# Patient Record
Sex: MALE | Race: WHITE | NOT HISPANIC OR LATINO | Employment: UNEMPLOYED | URBAN - METROPOLITAN AREA
[De-identification: names, ages, dates, MRNs, and addresses within clinical notes are randomized per-mention and may not be internally consistent; named-entity substitution may affect disease eponyms.]

---

## 2018-10-03 ENCOUNTER — APPOINTMENT (OUTPATIENT)
Dept: GENERAL RADIOLOGY | Facility: HOSPITAL | Age: 53
End: 2018-10-03

## 2018-10-03 ENCOUNTER — HOSPITAL ENCOUNTER (EMERGENCY)
Facility: HOSPITAL | Age: 53
Discharge: HOME | End: 2018-10-03
Attending: EMERGENCY MEDICINE | Admitting: EMERGENCY MEDICINE

## 2018-10-03 VITALS
BODY MASS INDEX: 20.76 KG/M2 | SYSTOLIC BLOOD PRESSURE: 216 MMHG | WEIGHT: 132.28 LBS | HEART RATE: 89 BPM | TEMPERATURE: 97.6 F | RESPIRATION RATE: 20 BRPM | HEIGHT: 67 IN | DIASTOLIC BLOOD PRESSURE: 134 MMHG | OXYGEN SATURATION: 100 %

## 2018-10-03 DIAGNOSIS — I10 HYPERTENSION, UNSPECIFIED TYPE: ICD-10-CM

## 2018-10-03 DIAGNOSIS — N18.6 END STAGE RENAL DISEASE (HCC): Primary | ICD-10-CM

## 2018-10-03 DIAGNOSIS — G89.29 CHRONIC LOW BACK PAIN WITHOUT SCIATICA, UNSPECIFIED BACK PAIN LATERALITY: ICD-10-CM

## 2018-10-03 DIAGNOSIS — T14.8XXA AVULSION OF SKIN: ICD-10-CM

## 2018-10-03 DIAGNOSIS — M54.50 CHRONIC LOW BACK PAIN WITHOUT SCIATICA, UNSPECIFIED BACK PAIN LATERALITY: ICD-10-CM

## 2018-10-03 LAB
ALBUMIN SERPL-MCNC: 3.77 G/DL (ref 3.2–4.8)
ALBUMIN/GLOB SERPL: 1.2 G/DL (ref 1.5–2.5)
ALP SERPL-CCNC: 160 U/L (ref 25–100)
ALT SERPL W P-5'-P-CCNC: 7 U/L (ref 7–40)
ANION GAP SERPL CALCULATED.3IONS-SCNC: 14 MMOL/L (ref 3–11)
AST SERPL-CCNC: 20 U/L (ref 0–33)
BASOPHILS # BLD AUTO: 0.03 10*3/MM3 (ref 0–0.2)
BASOPHILS NFR BLD AUTO: 0.4 % (ref 0–1)
BILIRUB SERPL-MCNC: 0.3 MG/DL (ref 0.3–1.2)
BUN BLD-MCNC: 31 MG/DL (ref 9–23)
BUN/CREAT SERPL: 6.8 (ref 7–25)
CALCIUM SPEC-SCNC: 10.3 MG/DL (ref 8.7–10.4)
CHLORIDE SERPL-SCNC: 90 MMOL/L (ref 99–109)
CO2 SERPL-SCNC: 30 MMOL/L (ref 20–31)
CREAT BLD-MCNC: 4.57 MG/DL (ref 0.6–1.3)
DEPRECATED RDW RBC AUTO: 51.2 FL (ref 37–54)
EOSINOPHIL # BLD AUTO: 0.07 10*3/MM3 (ref 0–0.3)
EOSINOPHIL NFR BLD AUTO: 1 % (ref 0–3)
ERYTHROCYTE [DISTWIDTH] IN BLOOD BY AUTOMATED COUNT: 16.9 % (ref 11.3–14.5)
GFR SERPL CREATININE-BSD FRML MDRD: 14 ML/MIN/1.73
GLOBULIN UR ELPH-MCNC: 3.1 GM/DL
GLUCOSE BLD-MCNC: 68 MG/DL (ref 70–100)
HCT VFR BLD AUTO: 30.1 % (ref 38.9–50.9)
HGB BLD-MCNC: 9.6 G/DL (ref 13.1–17.5)
IMM GRANULOCYTES # BLD: 0.02 10*3/MM3 (ref 0–0.03)
IMM GRANULOCYTES NFR BLD: 0.3 % (ref 0–0.6)
LYMPHOCYTES # BLD AUTO: 0.91 10*3/MM3 (ref 0.6–4.8)
LYMPHOCYTES NFR BLD AUTO: 13.5 % (ref 24–44)
MCH RBC QN AUTO: 26.5 PG (ref 27–31)
MCHC RBC AUTO-ENTMCNC: 31.9 G/DL (ref 32–36)
MCV RBC AUTO: 83.1 FL (ref 80–99)
MONOCYTES # BLD AUTO: 0.72 10*3/MM3 (ref 0–1)
MONOCYTES NFR BLD AUTO: 10.7 % (ref 0–12)
NEUTROPHILS # BLD AUTO: 5.03 10*3/MM3 (ref 1.5–8.3)
NEUTROPHILS NFR BLD AUTO: 74.4 % (ref 41–71)
PLATELET # BLD AUTO: 274 10*3/MM3 (ref 150–450)
PMV BLD AUTO: 10.2 FL (ref 6–12)
POTASSIUM BLD-SCNC: 4.6 MMOL/L (ref 3.5–5.5)
PROT SERPL-MCNC: 6.9 G/DL (ref 5.7–8.2)
RBC # BLD AUTO: 3.62 10*6/MM3 (ref 4.2–5.76)
SODIUM BLD-SCNC: 134 MMOL/L (ref 132–146)
WBC NRBC COR # BLD: 6.76 10*3/MM3 (ref 3.5–10.8)

## 2018-10-03 PROCEDURE — 85025 COMPLETE CBC W/AUTO DIFF WBC: CPT | Performed by: PHYSICIAN ASSISTANT

## 2018-10-03 PROCEDURE — 71046 X-RAY EXAM CHEST 2 VIEWS: CPT

## 2018-10-03 PROCEDURE — 99284 EMERGENCY DEPT VISIT MOD MDM: CPT

## 2018-10-03 PROCEDURE — 80053 COMPREHEN METABOLIC PANEL: CPT | Performed by: PHYSICIAN ASSISTANT

## 2018-10-03 RX ORDER — CLONIDINE HYDROCHLORIDE 0.1 MG/1
0.1 TABLET ORAL ONCE
Status: COMPLETED | OUTPATIENT
Start: 2018-10-03 | End: 2018-10-03

## 2018-10-03 RX ORDER — METOPROLOL SUCCINATE 50 MG/1
50 TABLET, EXTENDED RELEASE ORAL DAILY
Qty: 30 TABLET | Refills: 0 | Status: SHIPPED | OUTPATIENT
Start: 2018-10-03

## 2018-10-03 RX ADMIN — METOPROLOL TARTRATE 25 MG: 25 TABLET ORAL at 17:41

## 2018-10-03 RX ADMIN — CLONIDINE HYDROCHLORIDE 0.1 MG: 0.1 TABLET ORAL at 17:41

## 2018-10-03 NOTE — ED PROVIDER NOTES
Subjective   Patient comes emergency part today with multitude of complaints the biggest one is that he feels like he needs to be admitted for dialysis.  Patient reports he had dialysis yesterday but continues to have swelling of his lower legs and scrotum.  Patient reports that he had been living at the [facility] and apparently they convinced him to come to the emergency department for evaluation, they subsequently called and told our  that he is no longer allowed to come to the [facility].  Patient states that he had been living with his sister apparently had an overdose there and child protective services told the sister that either he had to go with her to take the children.  Subsequently his sister kicked him out of the house and this is how he ended up living at the [facility].  Patient has a history of back surgery that he states was due to an infection is done at [facility] per the patient.  States that he is able to walk short distances but is mostly in a wheelchair.  He gets dialysis on Tuesday Thursday Saturday.  Patient reports that the [facility] not been helping him get transportation there.  Patient initially denies being seen anywhere else, however I reviewed their [facility] of note from 2 days ago he was actually dialyze there and discharged as well as had treatment of his foot.  The patient's third complaint is of his foot states he hasn't skin avulsion.  States that nobody is looked at this, until I reminded in the [facility] done an x-ray and  had him set up for wound care today which she canceled and decided to come to the emergency department instead.  Patient reports he does not anything for pain right now.  He has prescriptions for his narcotics and his benzodiazepines which she's not been able to fill up to this point.  States that he had some type of a oral promise that there was a place he can go in [place] to stay and receive his dialysis but  he is not unaware of the name of the facility.  He reports that he is not short of breath he does not have difficulty laying down to sleep due to shortness of breath.  He's had no productive cough no fevers no chills.         History provided by:  Patient   used: No    Illness   Associated symptoms: no chest pain, no fever, no rash, no shortness of breath and no wheezing        Review of Systems   Constitutional: Negative for chills and fever.   Respiratory: Negative for chest tightness, shortness of breath and wheezing.    Cardiovascular: Negative for chest pain and palpitations.   Musculoskeletal: Negative for back pain and neck stiffness.   Skin: Negative for pallor and rash.   Neurological: Negative for dizziness and weakness.   Psychiatric/Behavioral: Positive for agitation and behavioral problems. Negative for confusion, decreased concentration, dysphoric mood, hallucinations, self-injury and sleep disturbance. The patient is not nervous/anxious and is not hyperactive.    All other systems reviewed and are negative.      Past Medical History:   Diagnosis Date   • Dialysis patient (CMS/[facility])        No Known Allergies    Past Surgical History:   Procedure Laterality Date   • BACK SURGERY         History reviewed. No pertinent family history.    Social History     Social History   • Marital status:      Social History Main Topics   • Smoking status: Current Every Day Smoker     Packs/day: 1.00     Types: Cigarettes   • Smokeless tobacco: Former User   • Alcohol use No   • Drug use: No     Other Topics Concern   • Not on file           Objective   Physical Exam   Constitutional: No distress.   HENT:   Head: Normocephalic and atraumatic.   Nose: Nose normal.   Eyes: Conjunctivae are normal. Right eye exhibits no discharge. Left eye exhibits no discharge. No scleral icterus.   Neck: Normal range of motion. Neck supple. No JVD present.   Cardiovascular: Normal rate, regular rhythm, normal  heart sounds and intact distal pulses.  Exam reveals no gallop and no friction rub.    No murmur heard.  Pulmonary/Chest: Effort normal and breath sounds normal. No respiratory distress. He has no wheezes. He has no rales. He exhibits no tenderness.   Abdominal: Soft. Bowel sounds are normal. He exhibits no distension. There is no tenderness. There is no guarding.   Lymphadenopathy:     He has no cervical adenopathy.   Skin: Capillary refill takes less than 2 seconds. He is not diaphoretic.   Venous stasis changes bilateral lower extremities.  He has dry scaling skin on both bilateral lower extremities as well.  The left heel has a large skin avulsion but is not extremely deep.  Clean there is no redness or induration no active bleeding.  Sensation to touch.  Distal pulses are palpable.  Cap refill less than 2 seconds   Psychiatric:   Patient's been confrontational agitated and overall rude.   Nursing note and vitals reviewed.      Procedures           ED Course  ED Course as of Oct 03 1728   Wed Oct 03, 2018   1720 Patient has not taken his blood pressure medication for today so he'll be given dosages here.  He has been found a place to go in [facility] with a lot of effort from our  staff.  Patient and agrees to go there and have his dialysis transferred to that [facility].  Patient was able to get up and ambulate 10 steps was able to use a walker and appears able to take care of himself as long as he has his wheelchair and walker.  Discussed the patient length and in detail with Dr. Alas he's reviewed the patient's x-rays and labs.To discharge him from here and arrange transportation to [place] [facility] this [facility]  They're going to transfer his dialysis up there and he should be able to dialyze on Saturday.  He has not been fluid overloaded at this point .  Dr. Alas feels that pt will be fine to have dialysis  [JASBIR]      ED Course  User Index  [JASBIR] Miguel Mares PA      Recent Results (from the past 24 hour(s))   Comprehensive Metabolic Panel    Collection Time: 10/03/18 11:24 AM   Result Value Ref Range    Glucose 68 (L) 70 - 100 mg/dL    BUN 31 (H) 9 - 23 mg/dL    Creatinine 4.57 (H) 0.60 - 1.30 mg/dL    Sodium 134 132 - 146 mmol/L    Potassium 4.6 3.5 - 5.5 mmol/L    Chloride 90 (L) 99 - 109 mmol/L    CO2 30.0 20.0 - 31.0 mmol/L    Calcium 10.3 8.7 - 10.4 mg/dL    Total Protein 6.9 5.7 - 8.2 g/dL    Albumin 3.77 3.20 - 4.80 g/dL    ALT (SGPT) 7 7 - 40 U/L    AST (SGOT) 20 0 - 33 U/L    Alkaline Phosphatase 160 (H) 25 - 100 U/L    Total Bilirubin 0.3 0.3 - 1.2 mg/dL    eGFR Non African Amer 14 (L) >60 mL/min/1.73    Globulin 3.1 gm/dL    A/G Ratio 1.2 (L) 1.5 - 2.5 g/dL    BUN/Creatinine Ratio 6.8 (L) 7.0 - 25.0    Anion Gap 14.0 (H) 3.0 - 11.0 mmol/L   CBC Auto Differential    Collection Time: 10/03/18 11:24 AM   Result Value Ref Range    WBC 6.76 3.50 - 10.80 10*3/mm3    RBC 3.62 (L) 4.20 - 5.76 10*6/mm3    Hemoglobin 9.6 (L) 13.1 - 17.5 g/dL    Hematocrit 30.1 (L) 38.9 - 50.9 %    MCV 83.1 80.0 - 99.0 fL    MCH 26.5 (L) 27.0 - 31.0 pg    MCHC 31.9 (L) 32.0 - 36.0 g/dL    RDW 16.9 (H) 11.3 - 14.5 %    RDW-SD 51.2 37.0 - 54.0 fl    MPV 10.2 6.0 - 12.0 fL    Platelets 274 150 - 450 10*3/mm3    Neutrophil % 74.4 (H) 41.0 - 71.0 %    Lymphocyte % 13.5 (L) 24.0 - 44.0 %    Monocyte % 10.7 0.0 - 12.0 %    Eosinophil % 1.0 0.0 - 3.0 %    Basophil % 0.4 0.0 - 1.0 %    Immature Grans % 0.3 0.0 - 0.6 %    Neutrophils, Absolute 5.03 1.50 - 8.30 10*3/mm3    Lymphocytes, Absolute 0.91 0.60 - 4.80 10*3/mm3    Monocytes, Absolute 0.72 0.00 - 1.00 10*3/mm3    Eosinophils, Absolute 0.07 0.00 - 0.30 10*3/mm3    Basophils, Absolute 0.03 0.00 - 0.20 10*3/mm3    Immature Grans, Absolute 0.02 0.00 - 0.03 10*3/mm3     Note: In addition to lab results from this visit, the labs listed above may include labs taken at another facility or during a different  "encounter within the last 24 hours. Please correlate lab times with ED admission and discharge times for further clarification of the services performed during this visit.    XR Chest 2 View   Final Result   A large bore central venous catheter is noted for   hemodialysis. There is no pneumothorax. Additionally, there are linear   bibasilar changes representing atelectasis or postinflammatory scarring.   There is no prior exam for comparison.       D:  10/03/2018   E:  10/03/2018       This report was finalized on 10/3/2018 1:41 PM by Dr. Faisal Gannon MD.            Vitals:    10/03/18 0941 10/03/18 1015 10/03/18 1316   BP: (!) 197/124  (!) 196/128   BP Location: Left arm  Left arm   Patient Position: Lying  Lying   Pulse: 92 103    Resp: 20     Temp: 98 °F (36.7 °C)     TempSrc: Oral     SpO2: 100%     Weight: 60 kg (132 lb 4.4 oz)     Height: 170.2 cm (67\")       Medications   CloNIDine (CATAPRES) tablet 0.1 mg (not administered)   metoprolol tartrate (LOPRESSOR) tablet 25 mg (not administered)     ECG/EMG Results (last 24 hours)     ** No results found for the last 24 hours. **                    MDM  Number of Diagnoses or Management Options  Avulsion of skin: new and requires workup  Chronic low back pain without sciatica, unspecified back pain laterality: established and improving  End stage renal disease (CMS/HCC): established and improving  Hypertension, unspecified type: established and improving     Amount and/or Complexity of Data Reviewed  Clinical lab tests: reviewed and ordered  Tests in the radiology section of CPT®: reviewed and ordered  Tests in the medicine section of CPT®: ordered and reviewed  Discuss the patient with other providers: yes          Final diagnoses:   End stage renal disease (CMS/HCC)   Avulsion of skin   Hypertension, unspecified type   Chronic low back pain without sciatica, unspecified back pain laterality            Miguel Mares PA  10/03/18 3968    "

## 2018-10-03 NOTE — PROGRESS NOTES
"Continued Stay Note  [facility]     Patient Name: Ross Sprague  MRN: [id number]  Today's Date: 10/3/2018    Admit Date: 10/3/2018          Discharge Plan     Row Name 10/03/18 1719       Plan    Plan homeless shelter in [facility]    Patient/Family in Agreement with Plan yes    Plan Comments SW received confirmation that pt. is not permitted to return to the [facility].  NIKHIL also confirmed that the [facility] team will not provide transporation for pt. at discharge.  NIKHIL has called numerous shelters in [facility] including the [facility] ([place]), [facility] ([place]- wait list), [facility] ([place]- not handicap accessible), [facility] ([place]- no call back), [facility] ([place]- meal services only), [facility] in [place] (day respite only), [facility] in [place] (\"working shelter\", pt. must aquires a job within 7 days and pass criminal back ground check and drug screen), and [facility] in [place].      [facility] stated they have a cot available and a handicap accessible shower that pt. must be able to use independently.  (i.e., no assistance with bathing).  NIKHIL spoke with Javier ([phone]) who stated pt. could come to [facility] at any time this evening.      NIKHIL requested RN walk pt. to obtain better assessment of mobility. Pt. was able to transition out of the bed, and with a walker ambulated 10 feet before sitting back down in his wheelchairt.  RN reported pt. did all of this independently.      NIKHIL spoke to ED PA and MD about pt's dialysis needs, per MD pt's GFR is well enough to go through Saturday without out dialysis. This will allow case management time to get dialysis clinics rearranged from [facility] in [place] to [place].  ED CM will arrange for pt. to have a walker prior to discharge.      NIKHIL has reviewed this final plan with MD FRANKLIN, CM, and CM Director prior to pt's discharge.  CM will " arrange [employer] transportation for pt. to get to the shelter.  Pt. verbalized understanding the shelter in [place] (for now) is his only shelter option.  Pt will be discharge today to the [facility] in [place].     Row Name 10/03/18 1519       Plan    Plan CM given information from Sharee TEJEDA that pt is homeless but may not be able to return to the [facility]. CM saw pt in the ED and also spoke with RN and PA who is caring for him. OLVIN Hallman. fire department paramedic and their CM are assisting to find a facility for his d/c.               Discharge Codes    No documentation.           ARIEL Fay

## 2018-10-03 NOTE — PROGRESS NOTES
Continued Stay Note  [facility]     Patient Name: Ross Sprague  MRN: [id number]  Today's Date: 10/3/2018    Admit Date: 10/3/2018          Discharge Plan     Row Name 10/03/18 1054       Plan    Plan NIKHIL received call from [facility]    Plan Comments SW spoke with Ibeth De Jesus,  for the general population at the [facility]. Ms. De Jesus's contact number is [phone]. Ms. De Jesus stated that due to pt's current medical condition of kidney failure, pt. medically exceeds the level of care they are able to provide pt.  Pt. gets outpatient dialysis at a clinic near [place] in [place].  Pt. uses a wheelchair and due to weakness, he has difficulty self propelling.  Pt. reportedly required assistance of 3 men to help pt. with bathing.  Per. Ms. De Jesus's report pt. needs to be able to self propel and perform self-care/personal hygiene tasks independently.  Ms. De Jesus stated pt. Likely needs a minimum of assisted living.  She stated pt. would likely be a good rehab candicate.      NIKHIL inquired about pt's access to his medications.  Ms. De Jesus stated that pt. has difficulty getting transportation to/from the pharmacy. She stated the [facility] tries to help provide bus tokens to their homeless population when they are able.      NIKHIL provided update to ER CM and CM Director that pt. is not permitted to return to the [facility] in his current medical condition.              Discharge Codes    No documentation.           ARIEL Fay

## 2018-10-03 NOTE — DISCHARGE PLACEMENT REQUEST
"Ross Sprague  (52 y.o. Male)     - Marlys LarsonRN  [phone]      Date of Birth Social Security Number Address Home Phone MRN    1965  [address] [id number] [phone] [id number]    Protestant Marital Status          None        Admission Date Admission Type Admitting Provider Attending Provider Department, Room/Bed    10/3/18 Emergency  Titi Alas MD [facility] Emergency Department,     Discharge Date Discharge Disposition Discharge Destination                       Attending Provider:  Titi Alas MD    Allergies:  No Known Allergies    Isolation:  None   Infection:  None   Code Status:  Not on file    Ht:  170.2 cm (67\")   Wt:  60 kg (132 lb 4.4 oz)    Admission Cmt:  None   Principal Problem:  None                Active Insurance as of 10/3/2018     Primary Coverage     Payor Plan Insurance Group Employer/Plan Group    MEDICARE MEDICARE A & B      Payor Plan Address Payor Plan Phone Number Effective From Effective To    [address] [phone] 1992     [facility] [address]       Subscriber Name Subscriber Birth Date Member ID       ROSS SPRAGUE 1965 [id number]                 Emergency Contacts      (Rel.) Home Phone Work Phone Mobile Phone    Leila Sprague (Daughter) [phone] -- --          [facility] Emergency Department  [address] [facility] [address]  Dept. Phone:  [phone]  Dept. Fax:   Date Ordered: Oct 3, 2018         Patient:  Ross Sprague MRN:  [id number]   [address] [id number] :  1965  SSN:    Phone: [phone] Sex:  M     Weight: 60 kg (132 lb 4.4 oz)         Ht Readings from Last 1 Encounters:   10/03/18 170.2 cm (67\")         Walker               (Order ID: [id number])    Diagnosis:  Avulsion of skin (T14.8XXA [ICD-10-CM] 879.8 [ICD-9-CM])  Chronic low back pain without sciatica, " unspecified back pain laterality (M54.5,G89.29 [ICD-10-CM] 724.2,338.29 [ICD-9-CM])   Quantity:  1     Equipment:  Walker Folding with Wheels  Length of Need (99 Months = Lifetime): 99 Months = Lifetime        Authorizing Provider's Phone: [phone]         Verbal Order Mode: Verbal with readback   Authorizing Provider: Titi Alas MD  Authorizing Provider's NPI: [id number]     Order Entered By: Monet Pate 10/3/2018  5:49 PM     Electronically signed by:  CRISTIANA Alas/ Monet Pate              Insurance Information                MEDICARE/MEDICARE A & B Phone: [phone]    Subscriber: Ross Sprague Subscriber#: [id number]    Group#:  Precert#:              Emergency Department Notes      Miguel Mares PA at 10/3/2018 12:13 PM          Subjective   Patient comes emergency part today with multitude of complaints the biggest one is that he feels like he needs to be admitted for dialysis.  Patient reports he had dialysis yesterday but continues to have swelling of his lower legs and scrotum.  Patient reports that he had been living at the [facility] and apparently they convinced him to come to the emergency department for evaluation, they subsequently called and told our  that he is no longer allowed to come to the [facility].  Patient states that he had been living with his sister apparently had an overdose there and child protective services told the sister that either he had to go with her to take the children.  Subsequently his sister kicked him out of the house and this is how he ended up living at the [facility].  Patient has a history of back surgery that he states was due to an infection is done at [facility] per the patient.  States that he is able to walk short distances but is mostly in a wheelchair.  He gets dialysis on Tuesday Thursday Saturday.  Patient reports that the [facility]'s not been helping him get transportation there.  Patient  initially denies being seen anywhere else, however I reviewed their [facility] of note from 2 days ago he was actually dialyze there and discharged as well as had treatment of his foot.  The patient's third complaint is of his foot states he hasn't skin avulsion.  States that nobody is looked at this, until I reminded in the [facility] done an x-ray and  had him set up for wound care today which she canceled and decided to come to the emergency department instead.  Patient reports he does not anything for pain right now.  He has prescriptions for his narcotics and his benzodiazepines which she's not been able to fill up to this point.  States that he had some type of a oral promise that there was a place he can go in [place] to stay and receive his dialysis but he is not unaware of the name of the facility.  He reports that he is not short of breath he does not have difficulty laying down to sleep due to shortness of breath.  He's had no productive cough no fevers no chills.         History provided by:  Patient   used: No    Illness   Associated symptoms: no chest pain, no fever, no rash, no shortness of breath and no wheezing        Review of Systems   Constitutional: Negative for chills and fever.   Respiratory: Negative for chest tightness, shortness of breath and wheezing.    Cardiovascular: Negative for chest pain and palpitations.   Musculoskeletal: Negative for back pain and neck stiffness.   Skin: Negative for pallor and rash.   Neurological: Negative for dizziness and weakness.   Psychiatric/Behavioral: Positive for agitation and behavioral problems. Negative for confusion, decreased concentration, dysphoric mood, hallucinations, self-injury and sleep disturbance. The patient is not nervous/anxious and is not hyperactive.    All other systems reviewed and are negative.      Past Medical History:   Diagnosis Date   • Dialysis patient (CMS/[facility])        No Known  Allergies    Past Surgical History:   Procedure Laterality Date   • BACK SURGERY         History reviewed. No pertinent family history.    Social History     Social History   • Marital status:      Social History Main Topics   • Smoking status: Current Every Day Smoker     Packs/day: 1.00     Types: Cigarettes   • Smokeless tobacco: Former User   • Alcohol use No   • Drug use: No     Other Topics Concern   • Not on file           Objective   Physical Exam   Constitutional: No distress.   HENT:   Head: Normocephalic and atraumatic.   Nose: Nose normal.   Eyes: Conjunctivae are normal. Right eye exhibits no discharge. Left eye exhibits no discharge. No scleral icterus.   Neck: Normal range of motion. Neck supple. No JVD present.   Cardiovascular: Normal rate, regular rhythm, normal heart sounds and intact distal pulses.  Exam reveals no gallop and no friction rub.    No murmur heard.  Pulmonary/Chest: Effort normal and breath sounds normal. No respiratory distress. He has no wheezes. He has no rales. He exhibits no tenderness.   Abdominal: Soft. Bowel sounds are normal. He exhibits no distension. There is no tenderness. There is no guarding.   Lymphadenopathy:     He has no cervical adenopathy.   Skin: Capillary refill takes less than 2 seconds. He is not diaphoretic.   Venous stasis changes bilateral lower extremities.  He has dry scaling skin on both bilateral lower extremities as well.  The left heel has a large skin avulsion but is not extremely deep.  Clean there is no redness or induration no active bleeding.  Sensation to touch.  Distal pulses are palpable.  Cap refill less than 2 seconds   Psychiatric:   Patient's been confrontational agitated and overall rude.   Nursing note and vitals reviewed.      Procedures          ED Course  ED Course as of Oct 03 1728   Wed Oct 03, 2018   1720 Patient has not taken his blood pressure medication for today so he'll be given dosages here.  He has been found a  place to go in [facility] with a lot of effort from our  staff.  Patient and agrees to go there and have his dialysis transferred to that [facility].  Patient was able to get up and ambulate 10 steps was able to use a walker and appears able to take care of himself as long as he has his wheelchair and walker.  Discussed the patient length and in detail with Dr. Alas he's reviewed the patient's x-rays and labs.To discharge him from here and arrange transportation to [facility] to this housing facility so he will not be homeless on the street tonight.  They're going to transfer his dialysis up there and he should be able to dialyze on Saturday.  He has not been fluid overloaded at this point .  Dr. Alas feels that pt will be fine to have dialysis  [JASBIR]      ED Course User Index  [JASBIR] Miguel Mares PA      Recent Results (from the past 24 hour(s))   Comprehensive Metabolic Panel    Collection Time: 10/03/18 11:24 AM   Result Value Ref Range    Glucose 68 (L) 70 - 100 mg/dL    BUN 31 (H) 9 - 23 mg/dL    Creatinine 4.57 (H) 0.60 - 1.30 mg/dL    Sodium 134 132 - 146 mmol/L    Potassium 4.6 3.5 - 5.5 mmol/L    Chloride 90 (L) 99 - 109 mmol/L    CO2 30.0 20.0 - 31.0 mmol/L    Calcium 10.3 8.7 - 10.4 mg/dL    Total Protein 6.9 5.7 - 8.2 g/dL    Albumin 3.77 3.20 - 4.80 g/dL    ALT (SGPT) 7 7 - 40 U/L    AST (SGOT) 20 0 - 33 U/L    Alkaline Phosphatase 160 (H) 25 - 100 U/L    Total Bilirubin 0.3 0.3 - 1.2 mg/dL    eGFR Non African Amer 14 (L) >60 mL/min/1.73    Globulin 3.1 gm/dL    A/G Ratio 1.2 (L) 1.5 - 2.5 g/dL    BUN/Creatinine Ratio 6.8 (L) 7.0 - 25.0    Anion Gap 14.0 (H) 3.0 - 11.0 mmol/L   CBC Auto Differential    Collection Time: 10/03/18 11:24 AM   Result Value Ref Range    WBC 6.76 3.50 - 10.80 10*3/mm3    RBC 3.62 (L) 4.20 - 5.76 10*6/mm3    Hemoglobin 9.6 (L) 13.1 - 17.5 g/dL    Hematocrit 30.1 (L) 38.9 - 50.9 %    MCV 83.1 80.0 - 99.0 fL    MCH 26.5 (L) 27.0 - 31.0 pg    MCHC  "31.9 (L) 32.0 - 36.0 g/dL    RDW 16.9 (H) 11.3 - 14.5 %    RDW-SD 51.2 37.0 - 54.0 fl    MPV 10.2 6.0 - 12.0 fL    Platelets 274 150 - 450 10*3/mm3    Neutrophil % 74.4 (H) 41.0 - 71.0 %    Lymphocyte % 13.5 (L) 24.0 - 44.0 %    Monocyte % 10.7 0.0 - 12.0 %    Eosinophil % 1.0 0.0 - 3.0 %    Basophil % 0.4 0.0 - 1.0 %    Immature Grans % 0.3 0.0 - 0.6 %    Neutrophils, Absolute 5.03 1.50 - 8.30 10*3/mm3    Lymphocytes, Absolute 0.91 0.60 - 4.80 10*3/mm3    Monocytes, Absolute 0.72 0.00 - 1.00 10*3/mm3    Eosinophils, Absolute 0.07 0.00 - 0.30 10*3/mm3    Basophils, Absolute 0.03 0.00 - 0.20 10*3/mm3    Immature Grans, Absolute 0.02 0.00 - 0.03 10*3/mm3     Note: In addition to lab results from this visit, the labs listed above may include labs taken at another facility or during a different encounter within the last 24 hours. Please correlate lab times with ED admission and discharge times for further clarification of the services performed during this visit.    XR Chest 2 View   Final Result   A large bore central venous catheter is noted for   hemodialysis. There is no pneumothorax. Additionally, there are linear   bibasilar changes representing atelectasis or postinflammatory scarring.   There is no prior exam for comparison.       D:  10/03/2018   E:  10/03/2018       This report was finalized on 10/3/2018 1:41 PM by Dr. Faisal Gannon MD.            Vitals:    10/03/18 0941 10/03/18 1015 10/03/18 1316   BP: (!) 197/124  (!) 196/128   BP Location: Left arm  Left arm   Patient Position: Lying  Lying   Pulse: 92 103    Resp: 20     Temp: 98 °F (36.7 °C)     TempSrc: Oral     SpO2: 100%     Weight: 60 kg (132 lb 4.4 oz)     Height: 170.2 cm (67\")       Medications   CloNIDine (CATAPRES) tablet 0.1 mg (not administered)   metoprolol tartrate (LOPRESSOR) tablet 25 mg (not administered)     ECG/EMG Results (last 24 hours)     ** No results found for the last 24 hours. **                    MDM  Number of Diagnoses or " Management Options  Avulsion of skin: new and requires workup  Chronic low back pain without sciatica, unspecified back pain laterality: established and improving  End stage renal disease (CMS/HCC): established and improving  Hypertension, unspecified type: established and improving     Amount and/or Complexity of Data Reviewed  Clinical lab tests: reviewed and ordered  Tests in the radiology section of CPT®:  reviewed and ordered  Tests in the medicine section of CPT®:  ordered and reviewed  Discuss the patient with other providers: yes          Final diagnoses:   End stage renal disease (CMS/HCC)   Avulsion of skin   Hypertension, unspecified type   Chronic low back pain without sciatica, unspecified back pain laterality            Miguel Mares PA  10/03/18 1728      Electronically signed by Miguel Mares PA at 10/3/2018  5:28 PM

## 2018-10-03 NOTE — PROGRESS NOTES
Discharge Planning Assessment  [facility]     Patient Name: Ross Sprague  MRN: [id number]  Today's Date: 10/3/2018    Admit Date: 10/3/2018          Discharge Needs Assessment    No documentation.             Discharge Plan     Row Name 10/03/18 1519       Plan    Plan CM given information from Sharee TEJEDA that pt is homeless but may not be able to [facility]. CM saw pt in the ED and also spoke with RN and PA who is caring for him. A [facility] fire department paramedic and their CM are assisting to find a facility for his d/c.   2018 Walker was ordered from [facility] and brought to the ER. CM called [facility] for transport but was informed they do not transport after 1900 unless previously scheduled. Called [facility] for transport to the [facility]'s shelter in [facility]     No service coordination in this encounter.      Durable Medical Equipment     No service coordination in this encounter.      Dialysis/Infusion     No service coordination in this encounter.      Home Medical Care     No service coordination in this encounter.      Social Care     No service coordination in this encounter.                Demographic Summary    No documentation.           Functional Status    No documentation.           Psychosocial    No documentation.           Abuse/Neglect    No documentation.           Legal    No documentation.           Substance Abuse    No documentation.           Patient Forms    No documentation.         Monet Pate

## 2018-10-03 NOTE — DISCHARGE PLACEMENT REQUEST
"Ross Sprague  (52 y.o. Male)     Date of Birth Social Security Number Address Home Phone MRN    1965  [address] [address] [phone] [id number]    Anglican Marital Status          None        Admission Date Admission Type Admitting Provider Attending Provider Department, Room/Bed    10/3/18 Emergency  Titi Alas MD [facility] Emergency Department, 11/11    Discharge Date Discharge Disposition Discharge Destination                       Attending Provider:  Titi Alas MD    Allergies:  No Known Allergies    Isolation:  None   Infection:  None   Code Status:  Not on file    Ht:  170.2 cm (67\")   Wt:  60 kg (132 lb 4.4 oz)    Admission Cmt:  None   Principal Problem:  None                Active Insurance as of 10/3/2018     Primary Coverage     Payor Plan Insurance Group Employer/Plan Group    MEDICARE MEDICARE A & B      Payor Plan Address Payor Plan Phone Number Effective From Effective To    [address] [phone] 9/1/1992     [address] [place] [id number]       Subscriber Name Subscriber Birth Date Member ID       ROSS SPRAGUE 1965 [id number]               Pass Number 1:  \"Sign\" transmittal event     Order Parameters     Order ID: [id number]   Procedure/Medication: Walker []   Patient: ROSS SPRAGUE [[id number]]   Encounter Date: 10/03/18 (MARTIN: [id number])   Encounter Type: Hospital Encounter [3]   Department: [facility] EMERGENCY DEPT [[id number]]   Order Workstation: [id number] [[id number]]   Order OTG: [facility] WOMENS CRECTR ORLY LOOFF   Order Sent By: Monet Pate [[id number]]   Rules Used     Patient (1): ED PATIENT UPDATE RULE [56077]   Destinations     Descriptor: LOC_SECURE_RX_PRINTER   Record: LOC_SECURE_RX_PRINTER [[id number]]   Map Used: [facility] ED-4 (CHECKOUT) [[id number]]   Destination Workstation: [id number] [[id number]]   Report:  ED Smarttext Prescription - Equipment [[id number]]   Group?:    Count: 1       Local Cached Properties "     Is_discharge_order = Yes [1]   Order_mode = Outpatient [1]   Order_type = General Supply [2]   General Orders Info     Order Status: Sent [2]       Emergency Contacts      (Rel.) Home Phone Work Phone Mobile Phone    Leila Sprague (Daughter) [phone] -- --            Insurance Information                MEDICARE/MEDICARE A & B Phone: [phone]    Subscriber: Celestine Ross Subscriber#: [id number]    Group#:  Precert#:           Orders (last 24 hrs)     Start     Ordered    10/03/18 1714  CloNIDine (CATAPRES) tablet 0.1 mg  Once      10/03/18 1712    10/03/18 1714  metoprolol tartrate (LOPRESSOR) tablet 25 mg  Once      10/03/18 1712    10/03/18 1314  PT Consult: Eval & Treat  Once      10/03/18 1313    10/03/18 1035  CBC & Differential  Once      10/03/18 1038    10/03/18 1035  Comprehensive Metabolic Panel  Once      10/03/18 1038    10/03/18 1035  XR Chest 2 View  1 Time Imaging      10/03/18 1038    10/03/18 1035  Wound dressing  Once     Comments:  Xeroform dressing    10/03/18 1038    10/03/18 1035  CBC Auto Differential  PROCEDURE ONCE      10/03/18 1038    10/03/18 0950  Inpatient Case Management  Consult  Once     Provider:  (Not yet assigned)    10/03/18 0949    10/03/18 0000  metoprolol succinate XL (TOPROL XL) 50 MG 24 hr tablet  Daily      10/03/18 1726    10/03/18 0000  Walker      10/03/18 1749

## 2018-10-04 NOTE — PROGRESS NOTES
Continued Stay Note  [facility]     Patient Name: Ross Sprague  MRN: [id number]  Today's Date: 10/4/2018    Admit Date: 10/3/2018          Discharge Plan     Row Name 10/04/18 1110       Plan    Plan Pt must call [facility] to request clinic transfer for HD    Plan Comments Upon pt's discharge from the ER yesterday it was determined that his dialysis clinic would need to be transfered to [facility].  The [facility] was already closed at the time of pt's discharge so the request was not able to be made yesterday.  SW has called [facility] today at [phone].  SW was told that since pt. was no longer at the hospital SW was not allowed to request the transfer.  It was reported that pt. must call [facility] himself to request clinic transfer.  SW called pt's provided cell phone number.  The number rang contiuously, with no ability to leave a message.  SW called the [facility] in [place] ([phone]) and spoke to pt. directly.  Pt. sounded well and did not report any distress or problems since discharge.  Pt. Was very pleasant during the phone conversation.  Shelter staff provided pt. with a pen and paper and pt. collected the [facility] information to call and request transfer.  Pt. stated his daughter has his cell phone at this time.  He is trying to get a hold of her for assistance.  Pt. gave SW permission to inform daughter of his location and shelter phone number if SW is able to reach her.  No further needs or concerns were reported.              Discharge Codes    No documentation.           ARIEL Fay

## 2019-01-01 ENCOUNTER — LAB REQUISITION (OUTPATIENT)
Dept: LAB | Facility: HOSPITAL | Age: 54
End: 2019-01-01

## 2019-01-01 DIAGNOSIS — Z00.00 ROUTINE GENERAL MEDICAL EXAMINATION AT A HEALTH CARE FACILITY: ICD-10-CM

## 2019-01-01 LAB
INR PPP: 1.49 (ref 0.9–1.1)
PROTHROMBIN TIME: 17.7 SECONDS (ref 11.7–14.2)

## 2019-01-01 PROCEDURE — 85610 PROTHROMBIN TIME: CPT

## 2019-06-10 ENCOUNTER — LAB REQUISITION (OUTPATIENT)
Dept: LAB | Facility: HOSPITAL | Age: 54
End: 2019-06-10

## 2019-06-10 DIAGNOSIS — Z00.00 ROUTINE GENERAL MEDICAL EXAMINATION AT A HEALTH CARE FACILITY: ICD-10-CM

## 2019-06-10 LAB
ANION GAP SERPL CALCULATED.3IONS-SCNC: 21 MMOL/L
BASOPHILS # BLD AUTO: 0.09 10*3/MM3 (ref 0–0.2)
BASOPHILS NFR BLD AUTO: 1.5 % (ref 0–1.5)
BUN BLD-MCNC: 37 MG/DL (ref 6–20)
BUN/CREAT SERPL: 9.3 (ref 7–25)
CALCIUM SPEC-SCNC: 9.6 MG/DL (ref 8.6–10.5)
CHLORIDE SERPL-SCNC: 91 MMOL/L (ref 98–107)
CO2 SERPL-SCNC: 21 MMOL/L (ref 22–29)
CREAT BLD-MCNC: 3.99 MG/DL (ref 0.76–1.27)
DEPRECATED RDW RBC AUTO: 56 FL (ref 37–54)
EOSINOPHIL # BLD AUTO: 0.14 10*3/MM3 (ref 0–0.4)
EOSINOPHIL NFR BLD AUTO: 2.4 % (ref 0.3–6.2)
ERYTHROCYTE [DISTWIDTH] IN BLOOD BY AUTOMATED COUNT: 15.8 % (ref 12.3–15.4)
GFR SERPL CREATININE-BSD FRML MDRD: 16 ML/MIN/1.73
GLUCOSE BLD-MCNC: 65 MG/DL (ref 65–99)
HCT VFR BLD AUTO: 41.6 % (ref 37.5–51)
HGB BLD-MCNC: 13.1 G/DL (ref 13–17.7)
IMM GRANULOCYTES # BLD AUTO: 0.02 10*3/MM3 (ref 0–0.05)
IMM GRANULOCYTES NFR BLD AUTO: 0.3 % (ref 0–0.5)
LYMPHOCYTES # BLD AUTO: 1.2 10*3/MM3 (ref 0.7–3.1)
LYMPHOCYTES NFR BLD AUTO: 20.3 % (ref 19.6–45.3)
MCH RBC QN AUTO: 30.5 PG (ref 26.6–33)
MCHC RBC AUTO-ENTMCNC: 31.5 G/DL (ref 31.5–35.7)
MCV RBC AUTO: 97 FL (ref 79–97)
MONOCYTES # BLD AUTO: 0.72 10*3/MM3 (ref 0.1–0.9)
MONOCYTES NFR BLD AUTO: 12.2 % (ref 5–12)
NEUTROPHILS # BLD AUTO: 3.74 10*3/MM3 (ref 1.7–7)
NEUTROPHILS NFR BLD AUTO: 63.3 % (ref 42.7–76)
NRBC BLD AUTO-RTO: 0 /100 WBC (ref 0–0.2)
PLATELET # BLD AUTO: 294 10*3/MM3 (ref 140–450)
PMV BLD AUTO: 10.6 FL (ref 6–12)
POTASSIUM BLD-SCNC: 5.1 MMOL/L (ref 3.5–5.2)
RBC # BLD AUTO: 4.29 10*6/MM3 (ref 4.14–5.8)
SODIUM BLD-SCNC: 133 MMOL/L (ref 136–145)
WBC NRBC COR # BLD: 5.91 10*3/MM3 (ref 3.4–10.8)

## 2019-06-10 PROCEDURE — 85025 COMPLETE CBC W/AUTO DIFF WBC: CPT

## 2019-06-10 PROCEDURE — 80048 BASIC METABOLIC PNL TOTAL CA: CPT
